# Patient Record
(demographics unavailable — no encounter records)

---

## 2024-12-02 NOTE — PHYSICAL EXAM
[No Acute Distress] : no acute distress [Normal Oropharynx] : normal oropharynx [Normal Appearance] : normal appearance [No Neck Mass] : no neck mass [Normal Rate/Rhythm] : normal rate/rhythm [Normal S1, S2] : normal s1, s2 [No Murmurs] : no murmurs [No Resp Distress] : no resp distress [Clear to Auscultation Bilaterally] : clear to auscultation bilaterally [No Abnormalities] : no abnormalities [Normal Gait] : normal gait [No Cyanosis] : no cyanosis [No Edema] : no edema [FROM] : FROM [Normal Color/ Pigmentation] : normal color/ pigmentation [No Focal Deficits] : no focal deficits [Oriented x3] : oriented x3 [Normal Affect] : normal affect [TextBox_105] : Clubbing noted

## 2024-12-02 NOTE — ASSESSMENT
[FreeTextEntry1] : 52-year-old current smoker with hx of COPD and asthma presenting to establish care at pulmonary clinic.  Reviewed:  CBC 2/2024- 240 eosinophils CT chest 9/2023- RUL <6mm calcified nodule with upper lobe destructive emphysema present. Airway thickening present PFT 3/2023: FVC (97%), FEV1 (78%), FEV1/FVC (65), TLC (67), RV (51), DLCO (72)- moderate obstruction with no reversibility, moderate restriction, mild reduction in DLCO Medicine notes reviewed PFT 8/5/2024: FVC (105%), FEV1 (95%), FEV1/FVC (72), TLC (75), RV (43), DLCO (75) No obstruction with mild restriction and mild reduction in DLCO. No reversibility Asthma profile + CBC 7/16/2024- 260 eosinophils IgE 07/17/2024- 674 (elevated)   Asthma/COPD overlap syndrome Current smoker  Patient feeling better on the LABA/LAMA/ICS therapy with the addition of the albuterol/ICS as needed for breakthrough. Did discuss the addition of Dupixent and would like to wait for now and see how he feels over the next 3 months. If shortness of breath does persist would plan to add Dupixent as he is largely a severe asthma.  - Asthma workup positive with high eosinophils and IgE - Low dose lung cancer screening CT ordered - Trelegy inhaler will add ICS/albuterol for as needed given asthma symptoms more predominant  RTC in 3 months

## 2024-12-02 NOTE — HISTORY OF PRESENT ILLNESS
[Current] : current [>= 20 pack years] : >= 20 pack years [TextBox_4] : 52 year old current smoker with hx of HTN, HLD, COPD, and asthma presents to pulmonary clinic to establish care. Patient states that he was diagnosed with Asthma about 5 years ago and smoked for the last 20-25 years about 1 pack per day. He has been on symbicort and spiriva for the last 5 years. He is able to do about 2 flights of stairs without stopping and still is using his albuterol inhaler 3-4 times per week and at least once at night time. He notices that his chest is tight when he has to use the inhaler. He recently was told to increase spiriva to 2 times per day which he has noticed some urinary retention.   He has reduced his smoking down to 1 to 2 cigarettes per week and is continuing to reduce this and nearly quit. He did breathing tests about a year ago and states that he has some snoring with frequent nighttime awakening and urination. As a child he was diagnosed with asthma needing albuterol pump however as he got older this improved. He noticed that once he started to smoke is when he started to have chest tightness again. He notices no triggers such as perfumes, construction, or animals.  8- Patient still on trelegy and feels that it is helping but is still using inhaler about 3 times to sometimes daily.   12/2/2024 States for the last week that he has been using the trelegy and the airsupra that he has only needed the albuterol/ICS once in the last week. States that he is feeling better than previous and has stopped smoking in the last month. Discussed lung cancer screening CT scan. [TextBox_11] : 1 [TextBox_13] : 20 [Obstructive Sleep Apnea] : obstructive sleep apnea [ESS] : 8